# Patient Record
Sex: FEMALE | Race: WHITE | Employment: OTHER | URBAN - METROPOLITAN AREA
[De-identification: names, ages, dates, MRNs, and addresses within clinical notes are randomized per-mention and may not be internally consistent; named-entity substitution may affect disease eponyms.]

---

## 2021-03-28 ENCOUNTER — APPOINTMENT (OUTPATIENT)
Dept: GENERAL RADIOLOGY | Age: 71
End: 2021-03-28
Payer: MEDICARE

## 2021-03-28 ENCOUNTER — HOSPITAL ENCOUNTER (EMERGENCY)
Age: 71
Discharge: HOME | End: 2021-03-28
Attending: EMERGENCY MEDICINE
Payer: MEDICARE

## 2021-03-28 VITALS
OXYGEN SATURATION: 98 % | WEIGHT: 140.6 LBS | SYSTOLIC BLOOD PRESSURE: 175 MMHG | HEART RATE: 88 BPM | TEMPERATURE: 98 F | DIASTOLIC BLOOD PRESSURE: 97 MMHG | RESPIRATION RATE: 16 BRPM

## 2021-03-28 DIAGNOSIS — S52.124A CLOSED NONDISPLACED FRACTURE OF HEAD OF RIGHT RADIUS, INITIAL ENCOUNTER: Primary | ICD-10-CM

## 2021-03-28 DIAGNOSIS — M25.422 EFFUSION OF LEFT ELBOW: ICD-10-CM
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
## 2021-03-28 PROCEDURE — 73080 X-RAY EXAM OF ELBOW: CPT

## 2021-03-28 PROCEDURE — 99283 EMERGENCY DEPT VISIT LOW MDM: CPT

## 2021-03-28 PROCEDURE — 29105 APPLICATION LONG ARM SPLINT: CPT

## 2021-03-28 ASSESSMENT — ENCOUNTER SYMPTOMS
CONSTIPATION: 0
DIARRHEA: 0
BACK PAIN: 0
COUGH: 0
SORE THROAT: 0
ABDOMINAL PAIN: 0
VOMITING: 0
SHORTNESS OF BREATH: 0
NAUSEA: 0

## 2021-03-28 ASSESSMENT — PAIN SCALES - GENERAL: PAINLEVEL_OUTOF10: 4

## 2021-03-28 NOTE — ED PROVIDER NOTES
[address]      Pt Name: Candice Beck  MRN: [id number]  [place] 1950  Date of evaluation: 3/28/2021  Provider: Emelia Henriquez MD    [address] [place] [address]       Chief Complaint   Patient presents with    Fall     AB arm pain          HISTORY OF PRESENT ILLNESS   (Location/Symptom, Timing/Onset, Context/Setting, Quality, Duration, Modifying Factors, Severity)  Note limiting factors. Candice Beck is a 73 y.o. female who presents to the emergency department     Patient is a 29-year-old female with a past medical history of hypertension who presents with bilateral elbow pain after a fall. Patient reports that she was walking out of her house on her way to [facility] when she tripped over her dog leash that she has tied up next to the door. Patient states that she fell forward onto outstretched hands. She did hit her nose but did not hit the rest of her head or lose consciousness. Had pain in both of her arm so decided to come in for x-rays. Did take ibuprofen prior to arrival.  Denies any lightheadedness, dizziness, chest pain, shortness of breath, nausea or vomiting. The history is provided by the patient. Nursing Notes were reviewed. REVIEW OF SYSTEMS    (2-9 systems for level 4, 10 or more for level 5)     Review of Systems   Constitutional: Negative for chills and fever. HENT: Negative for congestion and sore throat. Eyes: Negative for visual disturbance. Respiratory: Negative for cough and shortness of breath. Cardiovascular: Negative for chest pain. Gastrointestinal: Negative for abdominal pain, constipation, diarrhea, nausea and vomiting. Genitourinary: Negative for dysuria and hematuria. Musculoskeletal: Negative for back pain and neck pain. Skin: Negative for pallor and rash. Neurological: Negative for light-headedness and headaches. All other systems reviewed and are negative.       Except as noted above the remainder of the review of systems was reviewed and negative. PAST MEDICAL HISTORY   No past medical history on file. SURGICAL HISTORY     No past surgical history on file. CURRENT MEDICATIONS       Previous Medications    No medications on file       ALLERGIES     Codeine    FAMILY HISTORY     No family history on file.        SOCIAL HISTORY       Social History     Socioeconomic History    Marital status:      Spouse name: Not on file    Number of children: Not on file    Years of education: Not on file    Highest education level: Not on file   Occupational History    Not on file   Social Needs    Financial resource strain: Not on file    Food insecurity     Worry: Not on file     Inability: Not on file    Transportation needs     Medical: Not on file     Non-medical: Not on file   Tobacco Use    Smoking status: Current Every Day Smoker     Packs/day: 0.50    Smokeless tobacco: Never Used   Substance and Sexual Activity    Alcohol use: Not Currently    Drug use: Not on file    Sexual activity: Not Currently   Lifestyle    Physical activity     Days per week: Not on file     Minutes per session: Not on file    Stress: Not on file   Relationships    Social connections     Talks on phone: Not on file     Gets together: Not on file     Attends Gnosticist service: Not on file     Active member of club or organization: Not on file     Attends meetings of clubs or organizations: Not on file     Relationship status: Not on file    Intimate partner violence     Fear of current or ex partner: Not on file     Emotionally abused: Not on file     Physically abused: Not on file     Forced sexual activity: Not on file   Other Topics Concern    Not on file   Social History Narrative    Not on file       SCREENINGS               PHYSICAL EXAM    (up to 7 for level 4, 8 or more for level 5)     ED Triage Vitals   BP Temp Temp src Pulse Resp SpO2 Height Weight   -- -- -- -- -- -- -- --       Physical Exam  Vitals signs and nursing note reviewed. Constitutional:       General: She is not in acute distress. Appearance: Normal appearance. HENT:      Head: Normocephalic and atraumatic. Nose: Nose normal. No congestion. Mouth/Throat:      Mouth: Mucous membranes are moist.   Eyes:      Conjunctiva/sclera: Conjunctivae normal.   Neck:      Musculoskeletal: Normal range of motion and neck supple. Cardiovascular:      Rate and Rhythm: Normal rate and regular rhythm. Pulses: Normal pulses. Heart sounds: Normal heart sounds. Pulmonary:      Effort: Pulmonary effort is normal. No respiratory distress. Musculoskeletal:      Right elbow: She exhibits no swelling and no deformity. Left elbow: She exhibits no swelling and no deformity. Arms:       Comments: Patient remains neurovascularly intact. Skin:     General: Skin is warm and dry. Neurological:      General: No focal deficit present. Mental Status: She is alert and oriented to person, place, and time. DIAGNOSTIC RESULTS     EKG: All EKG's are interpreted by the Emergency Department Physician who either signs or Co-signs this chart in the absence of a cardiologist.        RADIOLOGY:     Interpretation per the Radiologist below, if available at the time of this note:    XR ELBOW LEFT (MIN 3 VIEWS)   Final Result   Impression:   1. Elbow joint effusion which can be seen in the setting of occult radial head fracture. Recommend correlation and consider follow-up radiograph. XR ELBOW RIGHT (MIN 3 VIEWS)   Final Result   Impression:   1. Slight deformity of contour radial neck on the AP view without clearly visible fracture line and without confirmation on the other projections. A very subtle radial head fracture is not excluded. Recommend correlation and consider cross-sectional    imaging.             LABS:  Labs Reviewed - No data to display    All other labs were within normal range or not returned as of made to edit the dictations but occasionally words are mis-transcribed.)    Ranjit Nicole MD (electronically signed)  Attending Emergency Physician            Tino Morales MD  03/28/21 9548

## 2022-12-21 ENCOUNTER — OFFICE VISIT (OUTPATIENT)
Dept: INTERNAL MEDICINE CLINIC | Age: 72
End: 2022-12-21
Payer: MEDICARE

## 2022-12-21 VITALS
TEMPERATURE: 97.7 F | HEIGHT: 63 IN | HEART RATE: 80 BPM | DIASTOLIC BLOOD PRESSURE: 85 MMHG | RESPIRATION RATE: 16 BRPM | SYSTOLIC BLOOD PRESSURE: 184 MMHG | WEIGHT: 142.7 LBS | OXYGEN SATURATION: 99 % | BODY MASS INDEX: 25.29 KG/M2

## 2022-12-21 DIAGNOSIS — Z76.89 ENCOUNTER TO ESTABLISH CARE WITH NEW DOCTOR: ICD-10-CM

## 2022-12-21 DIAGNOSIS — Z76.89 ENCOUNTER TO ESTABLISH CARE WITH NEW DOCTOR: Primary | ICD-10-CM

## 2022-12-21 DIAGNOSIS — G93.31 POSTVIRAL FATIGUE SYNDROME: ICD-10-CM

## 2022-12-21 DIAGNOSIS — R73.9 HYPERGLYCEMIA: ICD-10-CM

## 2022-12-21 PROCEDURE — 99213 OFFICE O/P EST LOW 20 MIN: CPT

## 2022-12-21 RX ORDER — FAMOTIDINE 20 MG/1
20 TABLET, FILM COATED ORAL DAILY
COMMUNITY

## 2022-12-21 RX ORDER — VIT C/HESPERIDIN/BIOFLAVONOIDS 500-100 MG
TABLET ORAL
COMMUNITY

## 2022-12-21 ASSESSMENT — PATIENT HEALTH QUESTIONNAIRE - PHQ9
SUM OF ALL RESPONSES TO PHQ QUESTIONS 1-9: 0
2. FEELING DOWN, DEPRESSED OR HOPELESS: 0
SUM OF ALL RESPONSES TO PHQ QUESTIONS 1-9: 0
1. LITTLE INTEREST OR PLEASURE IN DOING THINGS: 0
SUM OF ALL RESPONSES TO PHQ9 QUESTIONS 1 & 2: 0

## 2022-12-21 NOTE — PATIENT INSTRUCTIONS
Please review educational material     Please continue Pepcid for GERD    Please return to clinic in 1-2 months

## 2022-12-21 NOTE — PROGRESS NOTES
Outpatient Clinic New Patient Note    Patient: [place]  : 1950 (81 y.o.)  Date: 2022    CC: establish care; new pt    HPI:      80 yo female with PMH of HTN, HLD, GERD, hysterectomy [at 36 yo], current smoker [0.5 pack a day for 48 years], and COVID-19 came in to the office to establish care. Pt reported that she got COVID at the beginning of this month and since then she has been feeling fatigued and has not been as active and energetic since she had COVID. Pt also reported that since she had COVID her chronic cough has been a little different in terms that it is productive as opposed to dry, which she used to have before her COVID episode. Pt is a chronic smoker and attributes the chronic cough to her smoking. Of note, pt did not cough even one time during the encounter. Pt reported that her old PCP put her on a lipid lowering medication due to her elevated cholesterol, which caused her liver enzymes to go up so she decided to stop taking the medication. She is not sure of the name of the medication. She also reports that she used to be on a BP med, but has not been taking it for some time. Pt reports that she takes Pepcid. Says she has GERD. She explains that she gets a burning sensation in the epigastric region after her meals and certain foods make it worse than others and Pepcid has been helping with her GERD. Pt reported no other sx's, had no concerns or complaints, was agreeable to plan for obtaining basic lab tests before her next FU appointment to establish care. Past Medical History:    No past medical history on file. Past Surgical History:  No past surgical history on file. Home Meds:  Prior to Visit Medications    Medication Sig Taking?  Authorizing Provider   famotidine (PEPCID) 20 MG tablet Take 20 mg by mouth daily Yes Historical Provider, MD   Cholecalciferol (VITAMIN D3) 125 MCG (5000 UT) TABS Take by mouth Yes Historical Provider, MD Andrews-Lito (VITAMIN K2 PO) Take by mouth Yes Historical Provider, MD   Zinc 30 MG TABS Take by mouth Yes Historical Provider, MD   Acetylcysteine ( PO) Take by mouth Yes Historical Provider, MD   NONFORMULARY Indications: Mullein (herb) 330mg   3 daily Yes Historical Provider, MD       Allergies:    Sulfa antibiotics and Codeine    Family History:   No family history on file. Review of Systems  A 10-organ Review Of Systems was obtained and otherwise unremarkable except as per HPI. There is no immunization history on file for this patient. Health Maintenance Due   Topic Date Due    COVID-19 Vaccine (1) Never done    Pneumococcal 65+ years Vaccine (1 - PCV) Never done    Depression Screen  Never done    Hepatitis C screen  Never done    DTaP/Tdap/Td vaccine (1 - Tdap) Never done    Diabetes screen  Never done    Lipids  Never done    Colorectal Cancer Screen  Never done    Breast cancer screen  Never done    Shingles vaccine (1 of 2) Never done    DEXA (modify frequency per FRAX score)  Never done    Annual Wellness Visit (AWV)  Never done       Data: Old records have been reviewed electronically. PHYSICAL EXAM:  BP (!) 184/85 (Site: Right Upper Arm, Position: Sitting, Cuff Size: Medium Adult)   Pulse 80   Temp 97.7 °F (36.5 °C) (Temporal)   Resp 16   Ht 5' 3\" (1.6 m)   Wt 142 lb 11.2 oz (64.7 kg)   SpO2 99%   BMI 25.28 kg/m²   Physical Exam  Constitutional:       General: She is not in acute distress. Appearance: Normal appearance. She is normal weight. She is not diaphoretic. HENT:      Head: Normocephalic and atraumatic. Right Ear: External ear normal.      Left Ear: External ear normal.   Eyes:      General:         Right eye: No discharge. Left eye: No discharge. Extraocular Movements: Extraocular movements intact. Conjunctiva/sclera: Conjunctivae normal.   Cardiovascular:      Rate and Rhythm: Normal rate and regular rhythm. Heart sounds: Normal heart sounds. Pulmonary:      Effort: Pulmonary effort is normal. No respiratory distress. Breath sounds: Normal breath sounds. Abdominal:      General: Bowel sounds are normal.      Tenderness: There is no abdominal tenderness. Musculoskeletal:      Cervical back: Normal range of motion. Right lower leg: No edema. Left lower leg: No edema. Skin:     General: Skin is warm and dry. Coloration: Skin is not pale. Neurological:      Mental Status: She is alert and oriented to person, place, and time. Psychiatric:         Behavior: Behavior normal.       Assessment & Plan:      Encounter to establish care with new doctor  We will order some basic lab results to establish care. Of note, no mammogram in chart, consider ordering mammogram next visit as pt is 81years old. - CBC with Auto Differential; Future  - Comprehensive Metabolic Panel; Future  - Hemoglobin A1C; Future  - LIPID PANEL; Future  - Vitamin B12 & Folate; Future  - Vitamin D 25 Hydroxy; Future  - Iron and TIBC; Future  - Ferritin; Future  - Urinalysis  - TSH with Reflex; Future  - BILIRUBIN TOTAL DIRECT & INDIRECT; Future  - Hep C AB RLFX HCV PCR-A; Future    HTN  / 85  Provided some education material for dash diet, consider starting antihypertensives next visit. HLD  Chol 237,  in 2019  Pt reported that a lipid lowering med from her PCP caused elevations in her liver enzymes so she stopped using that medication. We have ordered LFTs to be drawn prior to next visit    GERD  Pt reported she has GERD and endorsed sx's related to GERD [see HPI], said that pepcid helps  - continue pepcid    Chronic smoker  Pt reported she has been smoking half a pack for 50 years, she denied hemoptysis or any recent weight loss, may benefit from imaging such as CXR or CT chest. Did provide some material on health benefits for smoking cessation. COVID-19  Asymptomatic; Problem not active.   Reported fatigue since her COVID Dx and a change in characteristic of her chronic cough, specifically more \"congested\" or productive than before. Return in about 2 months (around 2/21/2023).     Dispo: Pt has been staffed with Dr. Yvette Verma  _______________  Kapil Brannon MD, 12/21/2022 3:20 PM   PGY-1

## 2022-12-22 LAB
A/G RATIO: 2 (ref 1.1–2.2)
ALBUMIN SERPL-MCNC: 4.5 G/DL (ref 3.4–5)
ALP BLD-CCNC: 115 U/L (ref 40–129)
ALT SERPL-CCNC: 13 U/L (ref 10–40)
ANION GAP SERPL CALCULATED.3IONS-SCNC: 14 MMOL/L (ref 3–16)
AST SERPL-CCNC: 18 U/L (ref 15–37)
BASOPHILS ABSOLUTE: 0.1 K/UL (ref 0–0.2)
BASOPHILS RELATIVE PERCENT: 0.8 %
BILIRUB SERPL-MCNC: 0.4 MG/DL (ref 0–1)
BILIRUBIN DIRECT: <0.2 MG/DL (ref 0–0.3)
BILIRUBIN, INDIRECT: NORMAL MG/DL (ref 0–1)
BUN BLDV-MCNC: 10 MG/DL (ref 7–20)
CALCIUM SERPL-MCNC: 9.6 MG/DL (ref 8.3–10.6)
CHLORIDE BLD-SCNC: 104 MMOL/L (ref 99–110)
CHOLESTEROL, TOTAL: 231 MG/DL (ref 0–199)
CO2: 24 MMOL/L (ref 21–32)
CREAT SERPL-MCNC: 0.7 MG/DL (ref 0.6–1.2)
EOSINOPHILS ABSOLUTE: 0 K/UL (ref 0–0.6)
EOSINOPHILS RELATIVE PERCENT: 0.5 %
ESTIMATED AVERAGE GLUCOSE: 114 MG/DL
FERRITIN: 116.9 NG/ML (ref 15–150)
FOLATE: 14.62 NG/ML (ref 4.78–24.2)
GFR SERPL CREATININE-BSD FRML MDRD: >60 ML/MIN/{1.73_M2}
GLUCOSE BLD-MCNC: 89 MG/DL (ref 70–99)
HBA1C MFR BLD: 5.6 %
HCT VFR BLD CALC: 41.7 % (ref 36–48)
HDLC SERPL-MCNC: 45 MG/DL (ref 40–60)
HEMOGLOBIN: 13.9 G/DL (ref 12–16)
HEPATITIS C VIRUS AB BY CIA INDEX: 0.03 IV
HEPATITIS C VIRUS AB BY CIA INTERPRETATION: NEGATIVE
IRON SATURATION: 30 % (ref 15–50)
IRON: 89 UG/DL (ref 37–145)
LDL CHOLESTEROL CALCULATED: 150 MG/DL
LYMPHOCYTES ABSOLUTE: 2 K/UL (ref 1–5.1)
LYMPHOCYTES RELATIVE PERCENT: 22.8 %
MCH RBC QN AUTO: 28.8 PG (ref 26–34)
MCHC RBC AUTO-ENTMCNC: 33.4 G/DL (ref 31–36)
MCV RBC AUTO: 86.3 FL (ref 80–100)
MONOCYTES ABSOLUTE: 0.6 K/UL (ref 0–1.3)
MONOCYTES RELATIVE PERCENT: 6.3 %
NEUTROPHILS ABSOLUTE: 6.2 K/UL (ref 1.7–7.7)
NEUTROPHILS RELATIVE PERCENT: 69.6 %
PDW BLD-RTO: 13.2 % (ref 12.4–15.4)
PLATELET # BLD: 322 K/UL (ref 135–450)
PMV BLD AUTO: 9.3 FL (ref 5–10.5)
POTASSIUM SERPL-SCNC: 4.7 MMOL/L (ref 3.5–5.1)
RBC # BLD: 4.83 M/UL (ref 4–5.2)
SODIUM BLD-SCNC: 142 MMOL/L (ref 136–145)
TOTAL IRON BINDING CAPACITY: 301 UG/DL (ref 260–445)
TOTAL PROTEIN: 6.7 G/DL (ref 6.4–8.2)
TRIGL SERPL-MCNC: 182 MG/DL (ref 0–150)
TSH REFLEX: 1.57 UIU/ML (ref 0.27–4.2)
VITAMIN B-12: 188 PG/ML (ref 211–911)
VITAMIN D 25-HYDROXY: 76.5 NG/ML
VLDLC SERPL CALC-MCNC: 36 MG/DL
WBC # BLD: 8.9 K/UL (ref 4–11)

## 2023-02-17 ENCOUNTER — OFFICE VISIT (OUTPATIENT)
Dept: INTERNAL MEDICINE CLINIC | Age: 73
End: 2023-02-17
Payer: MEDICARE

## 2023-02-17 VITALS
BODY MASS INDEX: 24.8 KG/M2 | HEART RATE: 82 BPM | WEIGHT: 140 LBS | SYSTOLIC BLOOD PRESSURE: 152 MMHG | TEMPERATURE: 97.5 F | HEIGHT: 63 IN | OXYGEN SATURATION: 98 % | DIASTOLIC BLOOD PRESSURE: 84 MMHG | RESPIRATION RATE: 20 BRPM

## 2023-02-17 DIAGNOSIS — I10 PRIMARY HYPERTENSION: ICD-10-CM

## 2023-02-17 DIAGNOSIS — E78.2 MIXED HYPERLIPIDEMIA: ICD-10-CM

## 2023-02-17 DIAGNOSIS — R09.82 ALLERGIC RHINITIS WITH POSTNASAL DRIP: ICD-10-CM

## 2023-02-17 DIAGNOSIS — F17.200 NICOTINE DEPENDENCE WITH CURRENT USE: ICD-10-CM

## 2023-02-17 DIAGNOSIS — J30.9 ALLERGIC RHINITIS WITH POSTNASAL DRIP: ICD-10-CM

## 2023-02-17 PROBLEM — E78.5 HYPERLIPIDEMIA: Status: ACTIVE | Noted: 2023-02-17

## 2023-02-17 PROCEDURE — 99213 OFFICE O/P EST LOW 20 MIN: CPT

## 2023-02-17 RX ORDER — ROSUVASTATIN CALCIUM 10 MG/1
10 TABLET, COATED ORAL NIGHTLY
Qty: 30 TABLET | Refills: 2 | Status: SHIPPED | OUTPATIENT
Start: 2023-02-17

## 2023-02-17 RX ORDER — FLUTICASONE PROPIONATE 50 MCG
2 SPRAY, SUSPENSION (ML) NASAL DAILY
Qty: 16 G | Refills: 0 | Status: SHIPPED | OUTPATIENT
Start: 2023-02-17

## 2023-02-17 NOTE — PROGRESS NOTES
[facility]    Adriano Wills is a 72 y.o. female, here for a 2-month follow-up visit for the following concerns:    Hypertension:    The patient reports home SBP measurements in the 130s. She is mindful of her diet and tries to consistently adhere to low-sodium food. She has an active lifestyle, on her feet throughout the day. She has been able to cut back smoking cigarettes to 1/2 PPD and sometimes 1/4 PPD; down from 1 PPD; and remains motivated to quit. She tried Chantix in the past, but it caused severe nausea. She denies anxiety, blurred vision, headaches, lightheadedness, dizziness, diaphoresis, dyspnea with exertion, chest pain/pressure/tightness, palpitations, fatigue, neck pain, orthopnea, PND, and leg swelling. Hyperlipidemia:  The patient reports a previous history of elevated liver enzymes when taking atorvastatin 10 mg QD. She is willing to try an alternative statin as long as her liver enzymes are tracked. She denies nausea, vomiting, abdominal pain, and diarrhea. Sinusitis, post-nasal drip:  She reports experiencing constant post-nasal drip with the weather changing 2 days ago. This is accompanied by infraorbital sinus congestion. Sudafed did not provide any relief. She denies fever, sweats, chills, ear fullness, and headache. MEDICATIONS:  Prior to Visit Medications    Medication Sig Taking?  Authorizing Provider   rosuvastatin (CRESTOR) 10 MG tablet Take 1 tablet by mouth nightly Yes Ravin Rodriguez MD   fluticasone (FLONASE) 50 MCG/ACT nasal spray 2 sprays by Each Nostril route daily Yes Ravin Rodriguez MD   famotidine (PEPCID) 20 MG tablet Take 20 mg by mouth daily Yes Historical Provider, MD   Cholecalciferol (VITAMIN D3) 125 MCG (5000 UT) TABS Take by mouth Yes Historical Provider, MD   Menaquinone-7 (VITAMIN K2 PO) Take by mouth Yes Historical Provider, MD   Zinc 30 MG TABS Take by mouth  Patient not taking: Reported on 2/17/2023 Historical Provider, MD   Acetylcysteine ( PO) Take by mouth  Patient not taking: Reported on 2/17/2023  Historical Provider, MD   NONFORMULARY Indications: Mullein (herb) 330mg   3 daily  Patient not taking: Reported on 2/17/2023  Historical Provider, MD        Vitals:    02/17/23 1316 02/17/23 1326   BP: (!) 164/88 (!) 152/84   Site: Left Upper Arm Left Upper Arm   Position: Sitting Sitting   Cuff Size: Medium Adult Medium Adult   Pulse: 82    Resp: 20    Temp: 97.5 °F (36.4 °C)    TempSrc: Oral    SpO2: 98%    Weight: 140 lb (63.5 kg)    Height: 5' 3\" (1.6 m)       Estimated body mass index is 24.8 kg/m² as calculated from the following:    Height as of this encounter: 5' 3\" (1.6 m). Weight as of this encounter: 140 lb (63.5 kg). Physical Exam  Constitutional:       General: She is awake. She is not in acute distress. Appearance: Normal appearance. She is well-developed, well-groomed and normal weight. She is not ill-appearing, toxic-appearing or diaphoretic. HENT:      Head: Normocephalic and atraumatic. Nose: Mucosal edema present. No nasal tenderness or rhinorrhea. Right Nostril: No epistaxis. Left Nostril: No epistaxis. Mouth/Throat:      Lips: Pink. Mouth: Mucous membranes are moist.      Pharynx: Oropharynx is clear. Eyes:      General: No scleral icterus. Right eye: No discharge. Left eye: No discharge. Conjunctiva/sclera:      Right eye: Right conjunctiva is not injected. No exudate. Left eye: Left conjunctiva is not injected. No exudate. Cardiovascular:      Rate and Rhythm: Normal rate and regular rhythm. Pulses:           Radial pulses are 2+ on the right side and 2+ on the left side. Heart sounds: No murmur heard. No friction rub. No gallop. Pulmonary:      Effort: Pulmonary effort is normal.      Breath sounds: No wheezing, rhonchi or rales. Musculoskeletal:      Right lower leg: No edema.       Left lower leg: No edema.   Skin:     General: Skin is warm and dry.      Capillary Refill: Capillary refill takes less than 2 seconds.      Coloration: Skin is not ashen, jaundiced or pale.      Nails: There is no clubbing.   Neurological:      Mental Status: She is alert and oriented to person, place, and time.   Psychiatric:         Attention and Perception: Attention normal.         Mood and Affect: Affect normal.         Speech: Speech normal.         Behavior: Behavior normal. Behavior is cooperative.         Thought Content: Thought content normal.         Judgment: Judgment normal.       ASSESSMENT/PLAN:     Hypertension  Will return in 1 week with a record of her daily BP measurements, as well as bring in her home monitor to ensure appropriate calibration  Continue to adhere to a low-sodium diet  Will continue to stay active/exercise    Mixed hyperlipidemia  12/21/22:  Total 231.  .  HDL 45.  .  VLDL 36.  Start Crestor 10 mg QD  Start taking omega-3  FU in 3 months with instructions to obtain fasting lipid panel and LFTs 1 week prior to the visit    Allergic rhinitis with postnasal drip  Flonase PRN - she was instructed on the proper use and verbalized understanding  Zyrtec PRN    Nicotine dependence with current use  Encouraged to continue to work on reducing the amount smoked each day  Counseled on the benefits of smoking cessation          Return for FU HTN and blood work with Dr. Holman.    The patient was staffed with the teaching attending: Dr. Ronald Kenny.    An electronic signature was used to authenticate this note.    --Camille Holman MD     Addendum to Resident H& P/Progress note:  I have personally seen,examined and evaluated the patient. I have reviewed the current history, physical findings, labs and assessment and plan and agree with note as documented by resident MD ( )      Ronald Kenny MD, FACP

## 2023-02-17 NOTE — PATIENT INSTRUCTIONS
As discussed during your visit, please:    Keep up the great work in cutting back on smoking  The following medication has been sent to your pharmacy:  Crestor 10 mg per day for your high cholesterol/lipids  Flonase as needed for post-nasal drip and allergic rhinitis  Please get your labs drawn 1 week prior to your 3-month follow-up visit, which includes a fasting lipid panel and liver function panel  Recommend starting omega-3-fatty acid supplement  Please record your home blood pressure daily over the next week and return in 1 week with your home blood pressure cuff so we may check its accuracy and determine whether a medication should be started. Please return in 3 months for follow-up. Please call the clinic if you have any questions or concerns.

## 2023-03-11 DIAGNOSIS — E78.2 MIXED HYPERLIPIDEMIA: ICD-10-CM

## 2023-03-13 RX ORDER — FLUTICASONE PROPIONATE 50 MCG
2 SPRAY, SUSPENSION (ML) NASAL DAILY
Qty: 16 G | Refills: 2 | Status: SHIPPED | OUTPATIENT
Start: 2023-03-13 | End: 2023-04-12

## 2023-03-13 RX ORDER — ROSUVASTATIN CALCIUM 10 MG/1
10 TABLET, COATED ORAL NIGHTLY
Qty: 30 TABLET | Refills: 2 | Status: SHIPPED | OUTPATIENT
Start: 2023-03-13

## 2023-05-23 DIAGNOSIS — E78.2 MIXED HYPERLIPIDEMIA: ICD-10-CM

## 2023-05-23 LAB
ALBUMIN SERPL-MCNC: 4.4 G/DL (ref 3.4–5)
ALP SERPL-CCNC: 127 U/L (ref 40–129)
ALT SERPL-CCNC: 14 U/L (ref 10–40)
AST SERPL-CCNC: 19 U/L (ref 15–37)
BILIRUB DIRECT SERPL-MCNC: <0.2 MG/DL (ref 0–0.3)
BILIRUB INDIRECT SERPL-MCNC: NORMAL MG/DL (ref 0–1)
BILIRUB SERPL-MCNC: 0.3 MG/DL (ref 0–1)
CHOLEST SERPL-MCNC: 134 MG/DL (ref 0–199)
HDLC SERPL-MCNC: 53 MG/DL (ref 40–60)
LDL CHOLESTEROL CALCULATED: 62 MG/DL
PROT SERPL-MCNC: 6.6 G/DL (ref 6.4–8.2)
TRIGL SERPL-MCNC: 96 MG/DL (ref 0–150)
VLDLC SERPL CALC-MCNC: 19 MG/DL

## 2023-05-26 ENCOUNTER — OFFICE VISIT (OUTPATIENT)
Dept: INTERNAL MEDICINE CLINIC | Age: 73
End: 2023-05-26
Payer: MEDICARE

## 2023-05-26 VITALS
HEART RATE: 81 BPM | DIASTOLIC BLOOD PRESSURE: 80 MMHG | SYSTOLIC BLOOD PRESSURE: 147 MMHG | TEMPERATURE: 98 F | WEIGHT: 139.2 LBS | BODY MASS INDEX: 24.66 KG/M2 | RESPIRATION RATE: 16 BRPM | OXYGEN SATURATION: 97 % | HEIGHT: 63 IN

## 2023-05-26 DIAGNOSIS — J30.9 ALLERGIC RHINITIS WITH POSTNASAL DRIP: ICD-10-CM

## 2023-05-26 DIAGNOSIS — I10 PRIMARY HYPERTENSION: ICD-10-CM

## 2023-05-26 DIAGNOSIS — E78.2 MIXED HYPERLIPIDEMIA: Primary | ICD-10-CM

## 2023-05-26 DIAGNOSIS — F17.200 NICOTINE DEPENDENCE WITH CURRENT USE: ICD-10-CM

## 2023-05-26 DIAGNOSIS — R09.82 ALLERGIC RHINITIS WITH POSTNASAL DRIP: ICD-10-CM

## 2023-05-26 PROCEDURE — 99213 OFFICE O/P EST LOW 20 MIN: CPT

## 2023-05-26 NOTE — PATIENT INSTRUCTIONS
As discussed during your visit, please:    Please call the clinic if you would like to start the nicotine patches. Take your medication as prescribed  Healthy lifestyle as discussed  Please return in 6 months for follow up. Please call the clinic if you have any questions or concerns, [phone].

## 2023-05-26 NOTE — PROGRESS NOTES
[facility]    Ms. Miguelito Kenny is a 72 y.o. female, here for a follow-up visit for the following:    Hypertension:    Ms. Cristian Miranda reports home SBP measurements in the 110s to 120s. She continues to be mindful of her diet and tries to consistently adhere to low-sodium food. She continues with an active lifestyle, on her feet throughout the day. She has been unable to cut back smoking cigarettes below 1/2 PPD; however, she remains motivated to quit. She tried Chantix in the past, but it caused severe nausea. She would like time to think about whether or not to start nicotine patches. She denies anxiety, blurred vision, headaches, lightheadedness, dizziness, diaphoresis, dyspnea with exertion, chest pain/pressure/tightness, palpitations, fatigue, neck pain, orthopnea, PND, and leg swelling. Hyperlipidemia:  Ms. Cristian Miranda reports compliance with Crestor and has been tolerating the medication well. She denies nausea, vomiting, abdominal pain, and diarrhea. Sinusitis, post-nasal drip:  She reports experiencing constant post-nasal drip due to \"seasonal allergies\". This is accompanied by infraorbital sinus congestion. Flonase did not provide any relief. She started taking Zyrtec D, but has been advised to refrain from the use of products containing pseudoephedrine due to the potential to increase her BP. She denies fever, sweats, chills, ear fullness, and headache. Screening/health maintenance:  She does not want to receive vaccinations or complete screening for colorectal cancer, lung cancer, or mammogram for breast cancer. She performs regular home breast exams, including her axillary regions. She denies unintentional weight loss, cough, fever, sweats, chills, melena, hematochezia, changes in bowel habits. MEDICATIONS:  Prior to Visit Medications    Medication Sig Taking?  Authorizing Provider   rosuvastatin (CRESTOR) 10 MG tablet TAKE 1 TABLET BY MOUTH

## 2023-12-08 ENCOUNTER — OFFICE VISIT (OUTPATIENT)
Dept: INTERNAL MEDICINE CLINIC | Age: 73
End: 2023-12-08
Payer: MEDICARE

## 2023-12-08 DIAGNOSIS — I10 PRIMARY HYPERTENSION: Primary | ICD-10-CM

## 2023-12-08 PROCEDURE — 99213 OFFICE O/P EST LOW 20 MIN: CPT

## 2023-12-08 RX ORDER — PANTOPRAZOLE SODIUM 20 MG/1
40 TABLET, DELAYED RELEASE ORAL 2 TIMES DAILY
Qty: 30 TABLET | Refills: 3 | Status: SHIPPED | OUTPATIENT
Start: 2023-12-08 | End: 2023-12-11 | Stop reason: SDUPTHER

## 2023-12-08 RX ORDER — ONDANSETRON 4 MG/1
4 TABLET, FILM COATED ORAL DAILY PRN
Qty: 30 TABLET | Refills: 0 | Status: SHIPPED | OUTPATIENT
Start: 2023-12-08

## 2023-12-08 NOTE — PATIENT INSTRUCTIONS
As discussed during your visit, please:    Please give us a call if the nicotine patches are working well and you would like additional supply. Please return in 6 months for follow-up. Please call the clinic if you have any questions or concerns, [phone]. Thank you for trusting us with your care.

## 2023-12-08 NOTE — PROGRESS NOTES
The [address] [place] [address] Outpatient Internal Medicine Clinic    Ms. Satya Burgos is a 72 y.o. female, here for a 6-month follow-up visit. Hypertension:    Ms. Richard Springer reports home BP measurements in the 120s/70s, noting it to be 128/70s this morning. She continues to adhere to low-sodium food and continues to lead an active lifestyle, on her feet all day. She continues to smoke 1/2 PPD and is interested in trying low-dose nicotine patches. She has requested something to help with nausea should she develop such while on the patches. She denies anxiety, blurry vision, headaches, lightheadedness, dizziness, diaphoresis, dyspnea with exertion, chest pain/pressure/tightness, palpitations, fatigue, neck pain, orthopnea, PND, and leg swelling. Hyperlipidemia:  Ms. Richard Springer continues to be compliant with Crestor. She denies nausea, vomiting, abdominal pain, and diarrhea. Sinusitis, post-nasal drip:  She has ongoing intermittent postnasal drip due to \"seasonal allergies\". She has tried Flonase previously, but this was ineffective. She refrains from using any products containing pseudoephedrine due to the potential to increase her BP. She denies fever, sweats, chills, ear fullness, and headache. Screening/health maintenance:  She does not want to receive vaccinations or screening for colorectal cancer, lung cancer, or mammogram for breast cancer. She continues to perform home breast exams, including her axillary regions. She denies unintentional weight loss, cough, fever, sweats, chills, melena, hematochezia, and changes in bowel habits. MEDICATIONS:  Prior to Visit Medications    Medication Sig Taking?  Authorizing Provider   pantoprazole (PROTONIX) 20 MG tablet Take 2 tablets by mouth in the morning and at bedtime Yes Jaime Saravia MD   nicotine (NICODERM CQ) 7 MG/24HR Place 1 patch onto the skin daily for 14 days Yes Jaime Saravia MD   ondansetron (ZOFRAN) 4 MG tablet Take 1 tablet by mouth

## 2023-12-11 VITALS
BODY MASS INDEX: 24.77 KG/M2 | SYSTOLIC BLOOD PRESSURE: 169 MMHG | RESPIRATION RATE: 16 BRPM | DIASTOLIC BLOOD PRESSURE: 90 MMHG | HEIGHT: 63 IN | TEMPERATURE: 97.2 F | OXYGEN SATURATION: 97 % | HEART RATE: 86 BPM | WEIGHT: 139.8 LBS

## 2023-12-11 RX ORDER — PANTOPRAZOLE SODIUM 40 MG/1
40 TABLET, DELAYED RELEASE ORAL 2 TIMES DAILY
Qty: 60 TABLET | Refills: 3 | Status: SHIPPED | OUTPATIENT
Start: 2023-12-11 | End: 2024-04-09

## 2024-04-08 ENCOUNTER — TELEPHONE (OUTPATIENT)
Dept: INTERNAL MEDICINE CLINIC | Age: 74
End: 2024-04-08

## 2024-04-08 DIAGNOSIS — I10 PRIMARY HYPERTENSION: ICD-10-CM

## 2024-04-08 DIAGNOSIS — E78.2 MIXED HYPERLIPIDEMIA: Primary | ICD-10-CM

## 2024-04-08 RX ORDER — AMOXICILLIN 875 MG/1
875 TABLET, COATED ORAL 2 TIMES DAILY
Qty: 14 TABLET | Refills: 0 | Status: SHIPPED | OUTPATIENT
Start: 2024-04-08 | End: 2024-04-15

## 2024-04-08 RX ORDER — METHYLPREDNISOLONE 4 MG/1
TABLET ORAL
Qty: 1 KIT | Refills: 0 | Status: SHIPPED | OUTPATIENT
Start: 2024-04-08 | End: 2024-04-14

## 2024-04-08 NOTE — TELEPHONE ENCOUNTER
PT WANTS TO KNOW IF SHE NEED BLOOD WORK BEFORE HER KALEN IN MAY? PT ALSO WANTS TO KNOW IF SHE CAN GET MEDICATION FOR SINUS TROUBLE?

## 2024-04-08 NOTE — TELEPHONE ENCOUNTER
Called pt and informed her that she currently does not have any orders for labs for her next visit but it was been about a year since she had lab work done so would not be admitted any unit get them done before she comes in.  Patient stated she would like this so I ordered a CBC, CMP, and lipid panel.  Patient also states that she has been having a sinus infection for the last 7 days and has tried over-the-counter medications without any relief.  In the past when she had this she was given an antibiotic and a Medrol Dosepak.  Called in prescription for amoxicillin and Medrol Dosepak for her.  She will follow-up with her appointment May.

## 2024-05-28 DIAGNOSIS — E78.2 MIXED HYPERLIPIDEMIA: ICD-10-CM

## 2024-05-28 DIAGNOSIS — I10 PRIMARY HYPERTENSION: ICD-10-CM

## 2024-05-28 LAB
BASOPHILS # BLD: 0.1 K/UL (ref 0–0.2)
BASOPHILS NFR BLD: 1 %
DEPRECATED RDW RBC AUTO: 14.1 % (ref 12.4–15.4)
EOSINOPHIL # BLD: 0.3 K/UL (ref 0–0.6)
EOSINOPHIL NFR BLD: 2.7 %
HCT VFR BLD AUTO: 40.5 % (ref 36–48)
HGB BLD-MCNC: 13.6 G/DL (ref 12–16)
LYMPHOCYTES # BLD: 1.8 K/UL (ref 1–5.1)
LYMPHOCYTES NFR BLD: 18.5 %
MCH RBC QN AUTO: 28.1 PG (ref 26–34)
MCHC RBC AUTO-ENTMCNC: 33.6 G/DL (ref 31–36)
MCV RBC AUTO: 83.5 FL (ref 80–100)
MONOCYTES # BLD: 0.8 K/UL (ref 0–1.3)
MONOCYTES NFR BLD: 8 %
NEUTROPHILS # BLD: 6.7 K/UL (ref 1.7–7.7)
NEUTROPHILS NFR BLD: 69.8 %
PLATELET # BLD AUTO: 267 K/UL (ref 135–450)
PMV BLD AUTO: 9.2 FL (ref 5–10.5)
RBC # BLD AUTO: 4.86 M/UL (ref 4–5.2)
WBC # BLD AUTO: 9.6 K/UL (ref 4–11)

## 2024-05-29 LAB
ALBUMIN SERPL-MCNC: 4.1 G/DL (ref 3.4–5)
ALBUMIN/GLOB SERPL: 1.8 {RATIO} (ref 1.1–2.2)
ALP SERPL-CCNC: 160 U/L (ref 40–129)
ALT SERPL-CCNC: 22 U/L (ref 10–40)
ANION GAP SERPL CALCULATED.3IONS-SCNC: 11 MMOL/L (ref 3–16)
AST SERPL-CCNC: 32 U/L (ref 15–37)
BILIRUB SERPL-MCNC: 0.3 MG/DL (ref 0–1)
BUN SERPL-MCNC: 12 MG/DL (ref 7–20)
CALCIUM SERPL-MCNC: 9.6 MG/DL (ref 8.3–10.6)
CHLORIDE SERPL-SCNC: 104 MMOL/L (ref 99–110)
CHOLEST SERPL-MCNC: 115 MG/DL (ref 0–199)
CO2 SERPL-SCNC: 26 MMOL/L (ref 21–32)
CREAT SERPL-MCNC: 0.7 MG/DL (ref 0.6–1.2)
GFR SERPLBLD CREATININE-BSD FMLA CKD-EPI: >90 ML/MIN/{1.73_M2}
GLUCOSE SERPL-MCNC: 112 MG/DL (ref 70–99)
HDLC SERPL-MCNC: 48 MG/DL (ref 40–60)
LDLC SERPL CALC-MCNC: 50 MG/DL
POTASSIUM SERPL-SCNC: 4.9 MMOL/L (ref 3.5–5.1)
PROT SERPL-MCNC: 6.4 G/DL (ref 6.4–8.2)
SODIUM SERPL-SCNC: 141 MMOL/L (ref 136–145)
TRIGL SERPL-MCNC: 87 MG/DL (ref 0–150)
VLDLC SERPL CALC-MCNC: 17 MG/DL

## 2024-05-30 ENCOUNTER — OFFICE VISIT (OUTPATIENT)
Dept: INTERNAL MEDICINE CLINIC | Age: 74
End: 2024-05-30
Payer: MEDICARE

## 2024-05-30 VITALS
OXYGEN SATURATION: 98 % | WEIGHT: 141.6 LBS | SYSTOLIC BLOOD PRESSURE: 177 MMHG | TEMPERATURE: 97.9 F | BODY MASS INDEX: 25.09 KG/M2 | DIASTOLIC BLOOD PRESSURE: 95 MMHG | RESPIRATION RATE: 16 BRPM | HEART RATE: 88 BPM | HEIGHT: 63 IN

## 2024-05-30 DIAGNOSIS — F17.200 NICOTINE DEPENDENCE WITH CURRENT USE: ICD-10-CM

## 2024-05-30 DIAGNOSIS — E78.2 MIXED HYPERLIPIDEMIA: ICD-10-CM

## 2024-05-30 DIAGNOSIS — J30.9 ALLERGIC RHINITIS WITH POSTNASAL DRIP: ICD-10-CM

## 2024-05-30 DIAGNOSIS — M62.89 TENSOR FASCIA LATA SYNDROME: Primary | ICD-10-CM

## 2024-05-30 DIAGNOSIS — R09.82 ALLERGIC RHINITIS WITH POSTNASAL DRIP: ICD-10-CM

## 2024-05-30 PROCEDURE — 99213 OFFICE O/P EST LOW 20 MIN: CPT

## 2024-05-30 RX ORDER — MONTELUKAST SODIUM 5 MG/1
5 TABLET, CHEWABLE ORAL EVERY EVENING
Qty: 30 TABLET | Refills: 3 | Status: SHIPPED | OUTPATIENT
Start: 2024-05-30

## 2024-05-30 ASSESSMENT — PATIENT HEALTH QUESTIONNAIRE - PHQ9
2. FEELING DOWN, DEPRESSED OR HOPELESS: NOT AT ALL
SUM OF ALL RESPONSES TO PHQ9 QUESTIONS 1 & 2: 0
SUM OF ALL RESPONSES TO PHQ QUESTIONS 1-9: 0
1. LITTLE INTEREST OR PLEASURE IN DOING THINGS: NOT AT ALL
SUM OF ALL RESPONSES TO PHQ QUESTIONS 1-9: 0

## 2024-05-30 NOTE — PATIENT INSTRUCTIONS
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  Please call [phone] to arrange for physical therapy.  A topical cream, Diclofenac has been prescribed, which may be applied up to 4 times per day.      Quitting smoking will help reduce the sinus congestion and post-nasal drip.  Please give us a call if you want help in that process.      Please try taking Flonase and Singular (a prescription) daily to help with your post-nasal drip.  Atrovent has been prescribed, as well, to help dry your nasal passages.    Please return in 3 months for follow-up.    Please call the clinic if you have any questions or concerns, [phone].

## 2024-05-30 NOTE — PROGRESS NOTES
[facility]    Ms. Venice Nj is a 74 y.o. female with a MHx significant for HTN, mixed HLD, nicotine dependence, and chronic postnasal drip, here for a 6-month FU visit.    Ms. Nj reports her home BP measurements to be 120s-130s/70s, noting that it is always elevated when she comes to the clinic.  She continues to adhere to low-sodium food and leads an active lifestyle, keeping on her feet all day.  She is down to smoking 10 cigarettes/day and has yet to start the nicotine patches prescribed during her last clinic visit.  She denies anxiety, blurry vision, headaches, lightheadedness, dizziness, diaphoresis, dyspnea with exertion, chest pain/pressure/tightness, palpitations, fatigue, neck pain, symptoms of orthopnea, symptoms of PND, and leg swelling.    She endorses ongoing intermittent post nasal drip with nighttime congestion.  She reports it is worse this time of year; however, she has issues with such throughout the entire year.  She will periodically experience itchy/watery eyes.  Currently she has clear rhinorrhea.  She reports having tried Zyrtec daily for a long time without any improvement.  She tried Flonase at a different time for about 2-3 weeks, which was ineffective.  She denies any fever, sweats, chills, congestion, sinus pain, ear fullness/pressure, sore throat, neck pain.    Ms. Nj endorses intermittent, right hip pain with radiation to the right knee, which she attributes to compensating for her left leg weakness/left knee.  14 years ago she underwent surgical repair of a ruptured tendon and fractured left patella.  Since then, she has been unable to fully bend her left knee, feels extremely weak in her left leg, and favors her right side.  She has previously tried multiple knee braces and physical therapy.  She periodically uses 600 mg of ibuprofen to help with the pain; Tylenol is ineffective.      MEDICATIONS:  Prior to Visit Medications

## 2024-06-24 DIAGNOSIS — E78.2 MIXED HYPERLIPIDEMIA: ICD-10-CM

## 2024-06-24 RX ORDER — ROSUVASTATIN CALCIUM 10 MG/1
10 TABLET, COATED ORAL NIGHTLY
Qty: 90 TABLET | Refills: 3 | Status: SHIPPED | OUTPATIENT
Start: 2024-06-24

## 2024-06-24 NOTE — TELEPHONE ENCOUNTER
Requested Prescriptions     Pending Prescriptions Disp Refills    rosuvastatin (CRESTOR) 10 MG tablet 90 tablet 3     Sig: Take 1 tablet by mouth nightly       Last Clinic Visit:  5/30/2024     Next Clinic Appointment:  8/28/2024

## 2024-09-12 ENCOUNTER — OFFICE VISIT (OUTPATIENT)
Dept: INTERNAL MEDICINE CLINIC | Age: 74
End: 2024-09-12
Payer: MEDICARE

## 2024-09-12 VITALS
OXYGEN SATURATION: 97 % | HEART RATE: 96 BPM | DIASTOLIC BLOOD PRESSURE: 98 MMHG | SYSTOLIC BLOOD PRESSURE: 166 MMHG | BODY MASS INDEX: 24.85 KG/M2 | TEMPERATURE: 97.7 F | RESPIRATION RATE: 18 BRPM | WEIGHT: 140.3 LBS

## 2024-09-12 DIAGNOSIS — R09.82 ALLERGIC RHINITIS WITH POSTNASAL DRIP: ICD-10-CM

## 2024-09-12 DIAGNOSIS — J30.9 ALLERGIC RHINITIS WITH POSTNASAL DRIP: ICD-10-CM

## 2024-09-12 DIAGNOSIS — F41.9 ANXIETY: Primary | ICD-10-CM

## 2024-09-12 PROCEDURE — 99213 OFFICE O/P EST LOW 20 MIN: CPT

## 2024-09-12 RX ORDER — LORAZEPAM 0.5 MG/1
0.5 TABLET ORAL DAILY PRN
Qty: 30 TABLET | Refills: 0 | Status: SHIPPED | OUTPATIENT
Start: 2024-09-12 | End: 2024-10-12

## 2024-09-12 RX ORDER — LORATADINE 10 MG/1
10 TABLET ORAL 2 TIMES DAILY
Qty: 60 TABLET | Refills: 2 | Status: SHIPPED | OUTPATIENT
Start: 2024-09-12 | End: 2024-12-11

## 2024-09-27 DIAGNOSIS — R09.82 ALLERGIC RHINITIS WITH POSTNASAL DRIP: ICD-10-CM

## 2024-09-27 DIAGNOSIS — J30.9 ALLERGIC RHINITIS WITH POSTNASAL DRIP: ICD-10-CM

## 2024-09-27 RX ORDER — MONTELUKAST SODIUM 5 MG/1
5 TABLET, CHEWABLE ORAL EVERY EVENING
Qty: 90 TABLET | Refills: 1 | OUTPATIENT
Start: 2024-09-27

## 2024-10-07 RX ORDER — LORATADINE 10 MG/1
10 TABLET ORAL 2 TIMES DAILY
Qty: 60 TABLET | Refills: 2 | Status: SHIPPED | OUTPATIENT
Start: 2024-10-07 | End: 2025-01-05

## 2024-10-07 NOTE — TELEPHONE ENCOUNTER
Requested Prescriptions     Pending Prescriptions Disp Refills    loratadine (CLARITIN) 10 MG tablet 60 tablet 2     Sig: Take 1 tablet by mouth in the morning and at bedtime       Last Clinic Visit:  9/12/2024     Next Clinic Appointment:  10/17/2024

## 2024-11-20 ENCOUNTER — OFFICE VISIT (OUTPATIENT)
Dept: INTERNAL MEDICINE CLINIC | Age: 74
End: 2024-11-20
Payer: MEDICARE

## 2024-11-20 VITALS
HEIGHT: 63 IN | BODY MASS INDEX: 25.55 KG/M2 | SYSTOLIC BLOOD PRESSURE: 146 MMHG | OXYGEN SATURATION: 97 % | DIASTOLIC BLOOD PRESSURE: 86 MMHG | TEMPERATURE: 97.2 F | WEIGHT: 144.2 LBS | HEART RATE: 67 BPM | RESPIRATION RATE: 18 BRPM

## 2024-11-20 DIAGNOSIS — E78.2 MIXED HYPERLIPIDEMIA: ICD-10-CM

## 2024-11-20 DIAGNOSIS — K21.9 GASTROESOPHAGEAL REFLUX DISEASE, UNSPECIFIED WHETHER ESOPHAGITIS PRESENT: ICD-10-CM

## 2024-11-20 DIAGNOSIS — F17.211 CIGARETTE NICOTINE DEPENDENCE IN REMISSION: ICD-10-CM

## 2024-11-20 DIAGNOSIS — I10 PRIMARY HYPERTENSION: Primary | ICD-10-CM

## 2024-11-20 PROCEDURE — 99213 OFFICE O/P EST LOW 20 MIN: CPT

## 2024-11-20 RX ORDER — PANTOPRAZOLE SODIUM 40 MG/1
40 TABLET, DELAYED RELEASE ORAL 2 TIMES DAILY
Qty: 60 TABLET | Refills: 3 | Status: SHIPPED | OUTPATIENT
Start: 2024-11-20 | End: 2025-03-20

## 2024-11-20 ASSESSMENT — ENCOUNTER SYMPTOMS
GASTROINTESTINAL NEGATIVE: 1
RESPIRATORY NEGATIVE: 1

## 2024-11-20 NOTE — PROGRESS NOTES
MD Olga   Zinc 30 MG TABS Take by mouth Yes Provider, MD Olga        Vitals:    11/20/24 1058   BP: (!) 146/86   Site: Left Upper Arm   Position: Sitting   Cuff Size: Medium Adult   Pulse: 67   Resp: 18   Temp: 97.2 °F (36.2 °C)   TempSrc: Temporal   SpO2: 97%   Weight: 65.4 kg (144 lb 3.2 oz)   Height: 1.6 m (5' 3\")      Estimated body mass index is 25.54 kg/m² as calculated from the following:    Height as of this encounter: 1.6 m (5' 3\").    Weight as of this encounter: 65.4 kg (144 lb 3.2 oz).  Physical Exam  HENT:      Head: Normocephalic.   Eyes:      Extraocular Movements: Extraocular movements intact.      Pupils: Pupils are equal, round, and reactive to light.   Cardiovascular:      Rate and Rhythm: Normal rate and regular rhythm.   Pulmonary:      Effort: Pulmonary effort is normal.      Breath sounds: Normal breath sounds.   Abdominal:      General: Abdomen is flat.      Palpations: Abdomen is soft.   Musculoskeletal:         General: Normal range of motion.   Skin:     General: Skin is warm.      Capillary Refill: Capillary refill takes less than 2 seconds.   Neurological:      Mental Status: She is alert and oriented to person, place, and time.         ASSESSMENT/PLAN:      ASSESSMENT and PLAN ;      Diagnosis Orders   1. Primary hypertension     Made plan to continue quitting nicotine, reduce cafeine intake, and take BP log. Patient will try lifestyle changes before adding HTN medication   2. Mixed hyperlipidemia     Lipid profile reviewed, continue Crestor   3. Gastroesophageal reflux disease, unspecified whether esophagitis present     Restarting Protonix after trial of stopping       All above medical conditions have been reviewed and assessed     New Prescriptions    No medications on file       Patient Instructions   Please take your medications as directed.      If you have any questions, do not hesitate to contact us   If you feel seriously ill, please go directly to the Emergency

## 2024-11-20 NOTE — PATIENT INSTRUCTIONS
Please take your medications as directed.      If you have any questions, do not hesitate to contact us   If you feel seriously ill, please go directly to the Emergency Department, or call 911.

## 2025-02-19 RX ORDER — PANTOPRAZOLE SODIUM 40 MG/1
TABLET, DELAYED RELEASE ORAL
Qty: 180 TABLET | Refills: 1 | Status: SHIPPED | OUTPATIENT
Start: 2025-02-19

## 2025-03-06 ENCOUNTER — OFFICE VISIT (OUTPATIENT)
Dept: INTERNAL MEDICINE CLINIC | Age: 75
End: 2025-03-06
Payer: MEDICARE

## 2025-03-06 VITALS
HEART RATE: 118 BPM | DIASTOLIC BLOOD PRESSURE: 94 MMHG | TEMPERATURE: 98.1 F | OXYGEN SATURATION: 97 % | SYSTOLIC BLOOD PRESSURE: 144 MMHG | RESPIRATION RATE: 20 BRPM | BODY MASS INDEX: 25.16 KG/M2 | HEIGHT: 63 IN | WEIGHT: 142 LBS

## 2025-03-06 DIAGNOSIS — H60.391 OTHER INFECTIVE ACUTE OTITIS EXTERNA OF RIGHT EAR: Primary | ICD-10-CM

## 2025-03-06 PROCEDURE — 99213 OFFICE O/P EST LOW 20 MIN: CPT

## 2025-03-06 RX ORDER — PREDNISONE 20 MG/1
20 TABLET ORAL 2 TIMES DAILY
Qty: 10 TABLET | Refills: 0 | Status: SHIPPED | OUTPATIENT
Start: 2025-03-06 | End: 2025-03-11

## 2025-03-06 SDOH — ECONOMIC STABILITY: FOOD INSECURITY: WITHIN THE PAST 12 MONTHS, THE FOOD YOU BOUGHT JUST DIDN'T LAST AND YOU DIDN'T HAVE MONEY TO GET MORE.: NEVER TRUE

## 2025-03-06 SDOH — ECONOMIC STABILITY: FOOD INSECURITY: WITHIN THE PAST 12 MONTHS, YOU WORRIED THAT YOUR FOOD WOULD RUN OUT BEFORE YOU GOT MONEY TO BUY MORE.: NEVER TRUE

## 2025-03-06 ASSESSMENT — PATIENT HEALTH QUESTIONNAIRE - PHQ9
2. FEELING DOWN, DEPRESSED OR HOPELESS: NOT AT ALL
SUM OF ALL RESPONSES TO PHQ QUESTIONS 1-9: 1
1. LITTLE INTEREST OR PLEASURE IN DOING THINGS: SEVERAL DAYS
SUM OF ALL RESPONSES TO PHQ QUESTIONS 1-9: 1

## 2025-03-06 NOTE — PATIENT INSTRUCTIONS
As discussed during your visit, please:    An antibiotic (Augmentin) and a steroid (prednisone) has been sent to your pharmacy.  Please call the clinic if you do not have improvement in your symptoms over the next 2 to 3 days.  Please seek immediate medical attention if you experience fever, vomiting, or other concerning symptoms.    Please return in 3 months for a follow-up visit with Dr. Ronald Marquez.    Please call the clinic if you have any questions or concerns, [phone].      Thank you for allowing us to be a part of your care.

## 2025-03-06 NOTE — PROGRESS NOTES
Patient provided warm blanket. She is resting comfortably a waiting VIP transport.   screening discussed.  She does not wish to pursue any screening.  Discussed signs and symptoms, and encouraged her to call should she have any questions or concerns.  Does not wish to pursue a mammogram; however, she will continue to perform home breast exams, including examination of her axilla.        3-month FU visit on 5/26/25:  to discuss HTN, HLD, and to transition care to Dr. Ronald Marquez    The patient was staffed with the teaching attending: Dr. Nic Chavez.    An electronic signature was used to authenticate this note.    Camille Holman MD, JASS, CCRC  Internal Medicine, PGY-3

## 2025-05-28 ENCOUNTER — OFFICE VISIT (OUTPATIENT)
Dept: INTERNAL MEDICINE CLINIC | Age: 75
End: 2025-05-28
Payer: MEDICARE

## 2025-05-28 VITALS
SYSTOLIC BLOOD PRESSURE: 151 MMHG | BODY MASS INDEX: 25.82 KG/M2 | HEART RATE: 79 BPM | OXYGEN SATURATION: 96 % | RESPIRATION RATE: 16 BRPM | DIASTOLIC BLOOD PRESSURE: 84 MMHG | HEIGHT: 63 IN | WEIGHT: 145.7 LBS | TEMPERATURE: 97.3 F

## 2025-05-28 DIAGNOSIS — R09.82 ALLERGIC RHINITIS WITH POSTNASAL DRIP: ICD-10-CM

## 2025-05-28 DIAGNOSIS — E78.2 MIXED HYPERLIPIDEMIA: Primary | ICD-10-CM

## 2025-05-28 DIAGNOSIS — I10 PRIMARY HYPERTENSION: ICD-10-CM

## 2025-05-28 DIAGNOSIS — J30.9 ALLERGIC RHINITIS WITH POSTNASAL DRIP: ICD-10-CM

## 2025-05-28 DIAGNOSIS — Z12.31 ENCOUNTER FOR SCREENING MAMMOGRAM FOR BREAST CANCER: ICD-10-CM

## 2025-05-28 DIAGNOSIS — Z87.891 PERSONAL HISTORY OF TOBACCO USE: ICD-10-CM

## 2025-05-28 DIAGNOSIS — Z78.0 POST-MENOPAUSAL: ICD-10-CM

## 2025-05-28 DIAGNOSIS — Z13.1 DIABETES MELLITUS SCREENING: ICD-10-CM

## 2025-05-28 PROCEDURE — 99213 OFFICE O/P EST LOW 20 MIN: CPT

## 2025-05-28 RX ORDER — AZELASTINE 1 MG/ML
1 SPRAY, METERED NASAL 2 TIMES DAILY
Qty: 30 ML | Refills: 1 | Status: SHIPPED | OUTPATIENT
Start: 2025-05-28

## 2025-05-28 RX ORDER — FEXOFENADINE HCL 60 MG/1
60 TABLET, FILM COATED ORAL 2 TIMES DAILY PRN
Qty: 90 TABLET | Refills: 1 | Status: SHIPPED | OUTPATIENT
Start: 2025-05-28

## 2025-05-28 ASSESSMENT — ENCOUNTER SYMPTOMS
GASTROINTESTINAL NEGATIVE: 1
RESPIRATORY NEGATIVE: 1

## 2025-05-28 NOTE — ASSESSMENT & PLAN NOTE
Lipid panel (5/28/24): Total 115.  HDL 48.  LDL 50.  TG 87.  VLDL 17.  LFTs (5/28/24):  AST, ALT WNL.  ALP mildly elevated 160.  - continue crestor 10mg daily  - obtain lipid panel

## 2025-05-28 NOTE — PROGRESS NOTES
[facility]    Venice Nj is a 75 y.o. female, here for evaluation of the following concerns:    HPI  Ms. Venice Nj is a 75 y.o. female with a MHx significant for HTN, mixed HLD, nicotine dependence, and chronic postnasal drip, here for a 3 month follow up visit.    She was last seen on March 6, 2025 due to right-sided ear pain and was diagnosed with acute otitis externa she states that typical weeks to resolve with 10-day course of antibiotics significantly helped.  Since she has no current complaints.  She does check her blood pressure at home with a wrist cuff and her blood pressure is usually in the 120s 130s over 80s.  At today's visit was 150s over high 60s and around that has been the trend in the office has been elevated.  She is relatively active and tries to reduce her salt intake.  She denies any chest pain shortness of breath discomfort or increased leg swelling.    She currently smokes a half pack a day she tried Chantix in the past, but was symptomatic.(In the past she smoked 1 to 1/2 packs a day).    She does have post nasal drip worse when waking up she states that she has tried zyrtec and loratadine, but has not worked in the past, she has also tried Singular, ipratropium, and Flonase.      Screening/health maintenance  Vaccinations:  risks and benefits discussed.  She states she would rather pursue a natural route.  Colorectal and lung cancer screening discussed.  She does not wish to pursue any screening at this time  Does not wish to pursue a mammogram; however, she will continue to perform home breast exams, including examination of her axilla.  DEXA scan ordered    Review of Systems   Constitutional: Negative.    HENT: Negative.     Respiratory: Negative.     Cardiovascular: Negative.    Gastrointestinal: Negative.    Musculoskeletal: Negative.    Hematological: Negative.    Psychiatric/Behavioral: Negative.         MEDICATIONS:  Prior to Visit

## 2025-05-28 NOTE — PATIENT INSTRUCTIONS
1) A prescription was sent to your pharmacy for fexofenadine 60mg twice daily as needed, start taking it once a day and increase to twice a day (every 12 hours) if it is not working. We will assess at next visit how it is working.  If you need a refill before next visit please call us and we will fill it.    2) A script was also written for Azelastine nasal spray that may help as well with your post nasal drip    3) Please continue to check your BP at home. Please bring in your blood pressure cuff next visit so we can examine to see if there are any discrepancies.    4) Please obtain lab work after this visit.    5) Scripts have been written for bone density scan, a mammogram and low dose CT lung scan , these are active orders and no appointment has been made for these. If interested you can call to make appointments for these. They are active for one year.      5) Please follow up in our office the week of August 11th, I am in the clinic that week         Learning About Lung Cancer Screening  What is screening for lung cancer?     Lung cancer screening is a way to find some lung cancers early, before a person has any symptoms of the cancer.  Lung cancer screening may help those who have the highest risk for lung cancer--people age 50 and older who are or were heavy smokers. For most people, who aren't at increased risk, screening for lung cancer probably isn't helpful.  Screening won't prevent cancer. And it may not find all lung cancers. Lung cancer screening may lower the risk of dying from lung cancer in a small number of people.  How is it done?  Lung cancer screening is done with a low-dose CT (computed tomography) scan. A CT scan uses X-rays, or radiation, to make detailed pictures of your body. Experts recommend that screening be done in medical centers that focus on finding and treating lung cancer.  Who is screening recommended for?  Lung cancer screening is recommended for people age 50 and older who are

## 2025-05-28 NOTE — ASSESSMENT & PLAN NOTE
persistent without s/s of infxn worse in morning when waking up Loratadine, zyrtec, Singular, ipratropium, and Flonase have been ineffective.  Counseled on smoking cessation  Stop  loratadine 10 mg BID  - start fexofenadine 60mg BID PRN  - start azelastine nasal spray as needed

## 2025-06-19 DIAGNOSIS — J30.9 ALLERGIC RHINITIS WITH POSTNASAL DRIP: ICD-10-CM

## 2025-06-19 DIAGNOSIS — R09.82 ALLERGIC RHINITIS WITH POSTNASAL DRIP: ICD-10-CM

## 2025-06-19 RX ORDER — FEXOFENADINE HCL 60 MG/1
60 TABLET, FILM COATED ORAL 2 TIMES DAILY PRN
Qty: 180 TABLET | Refills: 1 | Status: SHIPPED | OUTPATIENT
Start: 2025-06-19

## 2025-06-19 NOTE — TELEPHONE ENCOUNTER
Requested Prescriptions     Pending Prescriptions Disp Refills    fexofenadine (ALLEGRA) 60 MG tablet [Pharmacy Med Name: FEXOFENADINE HCL 60 MG TABLET] 180 tablet 1     Sig: TAKE 1 TABLET BY MOUTH 2 TIMES DAILY AS NEEDED (FOR ALLERGY SYMPTOMS/ POSTNASAL DRIP)       Last Clinic Visit:  5/28/2025     Next Clinic Appointment:  8/12/2025    Wants 90 day supply

## 2025-07-09 DIAGNOSIS — E78.2 MIXED HYPERLIPIDEMIA: ICD-10-CM

## 2025-07-09 RX ORDER — ROSUVASTATIN CALCIUM 10 MG/1
10 TABLET, COATED ORAL NIGHTLY
Qty: 90 TABLET | Refills: 3 | Status: SHIPPED | OUTPATIENT
Start: 2025-07-09

## 2025-07-09 NOTE — TELEPHONE ENCOUNTER
Requested Prescriptions     Pending Prescriptions Disp Refills    rosuvastatin (CRESTOR) 10 MG tablet [Pharmacy Med Name: ROSUVASTATIN CALCIUM 10 MG TAB] 90 tablet 3     Sig: TAKE 1 TABLET BY MOUTH EVERY DAY AT NIGHT       Last Clinic Visit:  5/28/2025     Next Clinic Appointment:  8/12/2025

## 2025-08-05 DIAGNOSIS — Z87.891 PERSONAL HISTORY OF TOBACCO USE: ICD-10-CM

## 2025-08-05 DIAGNOSIS — I10 PRIMARY HYPERTENSION: ICD-10-CM

## 2025-08-05 DIAGNOSIS — Z13.1 DIABETES MELLITUS SCREENING: ICD-10-CM

## 2025-08-05 DIAGNOSIS — E78.2 MIXED HYPERLIPIDEMIA: ICD-10-CM

## 2025-08-05 LAB
ALBUMIN SERPL-MCNC: 4.2 G/DL (ref 3.4–5)
ALP SERPL-CCNC: 152 U/L (ref 40–129)
ALT SERPL-CCNC: 26 U/L (ref 10–40)
ANION GAP SERPL CALCULATED.3IONS-SCNC: 10 MMOL/L (ref 3–16)
AST SERPL-CCNC: 33 U/L (ref 15–37)
BILIRUB DIRECT SERPL-MCNC: 0.2 MG/DL (ref 0–0.3)
BILIRUB INDIRECT SERPL-MCNC: 0.3 MG/DL (ref 0–1)
BILIRUB SERPL-MCNC: 0.5 MG/DL (ref 0–1)
BUN SERPL-MCNC: 15 MG/DL (ref 7–20)
CALCIUM SERPL-MCNC: 9.5 MG/DL (ref 8.3–10.6)
CHLORIDE SERPL-SCNC: 103 MMOL/L (ref 99–110)
CHOLEST SERPL-MCNC: 118 MG/DL (ref 0–199)
CO2 SERPL-SCNC: 27 MMOL/L (ref 21–32)
CREAT SERPL-MCNC: 0.9 MG/DL (ref 0.6–1.2)
GFR SERPLBLD CREATININE-BSD FMLA CKD-EPI: 67 ML/MIN/{1.73_M2}
GLUCOSE SERPL-MCNC: 99 MG/DL (ref 70–99)
HDLC SERPL-MCNC: 40 MG/DL (ref 40–60)
LDLC SERPL CALC-MCNC: 56 MG/DL
PHOSPHATE SERPL-MCNC: 3.8 MG/DL (ref 2.5–4.9)
POTASSIUM SERPL-SCNC: 4.4 MMOL/L (ref 3.5–5.1)
PROT SERPL-MCNC: 6.4 G/DL (ref 6.4–8.2)
SODIUM SERPL-SCNC: 140 MMOL/L (ref 136–145)
TRIGL SERPL-MCNC: 110 MG/DL (ref 0–150)
VLDLC SERPL CALC-MCNC: 22 MG/DL

## 2025-08-06 LAB
EST. AVERAGE GLUCOSE BLD GHB EST-MCNC: 116.9 MG/DL
HBA1C MFR BLD: 5.7 %

## 2025-08-12 ENCOUNTER — OFFICE VISIT (OUTPATIENT)
Dept: INTERNAL MEDICINE CLINIC | Age: 75
End: 2025-08-12
Payer: MEDICARE

## 2025-08-12 VITALS
SYSTOLIC BLOOD PRESSURE: 161 MMHG | WEIGHT: 142.2 LBS | HEIGHT: 64 IN | TEMPERATURE: 97.3 F | BODY MASS INDEX: 24.28 KG/M2 | RESPIRATION RATE: 20 BRPM | OXYGEN SATURATION: 97 % | HEART RATE: 80 BPM | DIASTOLIC BLOOD PRESSURE: 90 MMHG

## 2025-08-12 DIAGNOSIS — R09.82 ALLERGIC RHINITIS WITH POSTNASAL DRIP: ICD-10-CM

## 2025-08-12 DIAGNOSIS — J30.9 ALLERGIC RHINITIS WITH POSTNASAL DRIP: ICD-10-CM

## 2025-08-12 DIAGNOSIS — R03.0 ELEVATED BLOOD PRESSURE READING: Primary | ICD-10-CM

## 2025-08-12 PROCEDURE — 99213 OFFICE O/P EST LOW 20 MIN: CPT

## 2025-08-12 ASSESSMENT — LIFESTYLE VARIABLES: HOW OFTEN DO YOU HAVE A DRINK CONTAINING ALCOHOL: NEVER

## 2025-08-12 ASSESSMENT — ENCOUNTER SYMPTOMS
GASTROINTESTINAL NEGATIVE: 1
RESPIRATORY NEGATIVE: 1

## 2025-08-22 DIAGNOSIS — K21.9 GASTROESOPHAGEAL REFLUX DISEASE, UNSPECIFIED WHETHER ESOPHAGITIS PRESENT: Primary | ICD-10-CM

## 2025-08-22 RX ORDER — PANTOPRAZOLE SODIUM 40 MG/1
40 TABLET, DELAYED RELEASE ORAL DAILY
Qty: 180 TABLET | Refills: 0 | Status: SHIPPED | OUTPATIENT
Start: 2025-08-22